# Patient Record
Sex: FEMALE | Race: WHITE | NOT HISPANIC OR LATINO | Employment: UNEMPLOYED | URBAN - METROPOLITAN AREA
[De-identification: names, ages, dates, MRNs, and addresses within clinical notes are randomized per-mention and may not be internally consistent; named-entity substitution may affect disease eponyms.]

---

## 2018-08-03 ENCOUNTER — TELEPHONE (OUTPATIENT)
Dept: TRANSPLANT | Facility: CLINIC | Age: 23
End: 2018-08-03

## 2018-08-03 NOTE — TELEPHONE ENCOUNTER
Christine Herrera called and stated that she is interested in becoming a living donor for her friend, Jackson Eaton.  Medical and social history obtained.  No contraindications noted.  All questions answered.  Education book emailed to patient. Instructed to contact me if she would like to be tested after reviewing the information. Patient agreed.

## 2018-08-10 ENCOUNTER — TELEPHONE (OUTPATIENT)
Dept: TRANSPLANT | Facility: CLINIC | Age: 23
End: 2018-08-10

## 2018-08-21 ENCOUNTER — TELEPHONE (OUTPATIENT)
Dept: TRANSPLANT | Facility: CLINIC | Age: 23
End: 2018-08-21

## 2018-08-21 DIAGNOSIS — Z00.5 TRANSPLANT DONOR EVALUATION: Primary | ICD-10-CM

## 2018-08-22 ENCOUNTER — LAB VISIT (OUTPATIENT)
Dept: LAB | Facility: HOSPITAL | Age: 23
End: 2018-08-22
Payer: MEDICARE

## 2018-08-22 DIAGNOSIS — Z00.5 TRANSPLANT DONOR EVALUATION: ICD-10-CM

## 2018-08-22 PROCEDURE — 86901 BLOOD TYPING SEROLOGIC RH(D): CPT | Mod: TXP

## 2018-08-22 PROCEDURE — 81373 HLA I TYPING 1 LOCUS LR: CPT | Mod: 91,PO,TXP

## 2018-08-22 PROCEDURE — 81376 HLA II TYPING 1 LOCUS LR: CPT | Mod: 91,PO,TXP

## 2018-08-22 PROCEDURE — 81373 HLA I TYPING 1 LOCUS LR: CPT | Mod: PO,TXP

## 2018-08-22 PROCEDURE — 36415 COLL VENOUS BLD VENIPUNCTURE: CPT | Mod: TXP

## 2018-08-22 PROCEDURE — 81376 HLA II TYPING 1 LOCUS LR: CPT | Mod: PO,TXP

## 2018-08-24 LAB
ABO GROUP BLD: NORMAL
RH BLD: NORMAL

## 2018-09-04 ENCOUNTER — TELEPHONE (OUTPATIENT)
Dept: TRANSPLANT | Facility: CLINIC | Age: 23
End: 2018-09-04

## 2018-09-04 NOTE — TELEPHONE ENCOUNTER
Patient notified that the results of the crossmatch with [employer] show that they are compatible. States she would like to proceed with the medical evaluation. Information to apply for travel assistance emailed to patient. Will hold on scheduling appointments until we know if she will qualify for travel assistance. All questions were answered and patient verbalized understanding.

## 2018-10-12 ENCOUNTER — TELEPHONE (OUTPATIENT)
Dept: TRANSPLANT | Facility: CLINIC | Age: 23
End: 2018-10-12

## 2018-10-12 DIAGNOSIS — Z00.5 TRANSPLANT DONOR EVALUATION: Primary | ICD-10-CM

## 2019-01-04 ENCOUNTER — TELEPHONE (OUTPATIENT)
Dept: TRANSPLANT | Facility: CLINIC | Age: 24
End: 2019-01-04

## 2019-01-04 NOTE — TELEPHONE ENCOUNTER
"SW received call back from [phone]. Pt reports the number listed in epic is no longer in service [phone]. Pt reports [phone] is the new contact number. SW verbalized understanding. SW proceeded with questions regarding NLDAC.     Pt reports minor children will reside with mother in [place] while pt is staying locally for appointments. SW inquired about proof of income.  Pt also reports she does not work but her  is employed as an  with the [employer]. Pt reports  is paid daily, by check. Therefore, she could not provide proof of income. SW reports proof of income is needed for application. Pt verbalized understanding.     SW also inquired about pt's need for service animals. Pt reports "I have emotional support animals. I was diagnosed with clinical depression, anxiety and bipolar depression when I was 14 years old". Pt reports no episodes but reports "this all started when my parents were going through a divorce and I spiraled out of control. He was in the  and we moved around a lot too". SW inquired if pt was followed by a mental health professional. Pt reports "no, I have done all my therapy. I have meds now and I have to keep my dogs with me". SW reports pt will need to be cleared by psychiatry. Pt verbalized understanding and agreement.     Pt reports appointments in the transplant clinic have already been scheduled for Jan 7,8 and 9. Pt reports ability to donate is dependent upon NLDAC approval.  SW verbalized understanding and reports application could take up to 10 days for approval.  Pt verbalized understanding. SW remains available. NLDAC will be completed by JAMAR Villalobos.   "

## 2019-01-04 NOTE — TELEPHONE ENCOUNTER
"SW attempted to contact pt with questions regarding NLDAC application. SW called to inquire about pt's employment status, income , need for service animal,care for children and caregiver. RISHABH was not able to speak with pt after several attempts. RISHABH was not able to leave a message. SW contacted pt's recipient to inquire about pt's additional contact numbers. Recipient reported number listed in epic and reports "she might not be answering cause she is at work". SW verbalized understanding. SW will attempt contact at another time and remains available.   "

## 2019-01-07 ENCOUNTER — TELEPHONE (OUTPATIENT)
Dept: TRANSPLANT | Facility: CLINIC | Age: 24
End: 2019-01-07

## 2019-01-07 NOTE — TELEPHONE ENCOUNTER
Message received from Blanca Dahl that patient would like to cancel testing for today and tomorrow. Called patient, phone not working. e-mail sent to patient notifying her that appointments have been cancelled. She can call to reschedule when ready.

## 2019-01-11 ENCOUNTER — TELEPHONE (OUTPATIENT)
Dept: TRANSPLANT | Facility: CLINIC | Age: 24
End: 2019-01-11

## 2019-01-11 NOTE — TELEPHONE ENCOUNTER
Patient calling to inquire about status of travel assistance. Informed that  was trying to reach her to obtain additional information. Phone number has changed, Epic updated.  Transferred to ADEOLA Pearson.     ----- Message from Lachelle Werner sent at 1/11/2019  3:09 PM CST -----  Contact: Patient  Needs Advice    Reason for call: Calling to speak with coordinator        Communication Preference: [phone]    Additional Information: N/A

## 2019-01-14 ENCOUNTER — TELEPHONE (OUTPATIENT)
Dept: TRANSPLANT | Facility: CLINIC | Age: 24
End: 2019-01-14

## 2019-01-14 NOTE — TELEPHONE ENCOUNTER
"SW spoke with pt to obtain additional information needed for the [facility] application.    Additionally, SW talked about pt traveling with her service animal and children. Pt reports that she has a American Pitbull Terrier as her service animal and reports that she has all of the documentation that goes along with the service animal. SW asked about pt's children. Pt reports having 2 minor children age 1 and 2. Pt reports that for the evaluation, she has someone who will be able to watch her children, but plans to bring them for the surgery. SW explained that it would be difficult for her caregiver/husbnad to take care of all of them post surgery. Pt then reports that "Danelle" whom pt later identified as the recipient's wife would care for the children. SW explained that Danelle could not because she would be caring for the recipient. Pt then reports that the recipient's adult children would assist with care. SW asked if they were aware and she reports yes. SW also informed pt that if pt is relying on [facility] funds for travel and lodging, they are likely to not cover travel for her children, but only for the accompanying person. Patient verbalized understanding and agreement.       Pt's caregiver plan will need to assessed for self and minor children at time of her initial assessment.  "

## 2019-01-17 ENCOUNTER — TELEPHONE (OUTPATIENT)
Dept: TRANSPLANT | Facility: CLINIC | Age: 24
End: 2019-01-17

## 2019-01-17 NOTE — TELEPHONE ENCOUNTER
SW attempted to return pt's call to discuss her NLDAC application. There was no option to leave a voicemail due to mailbox being full.     SW remains available to pt, pt's family, and transplant team at [phone].        ----- Message from Elsa Cloud sent at 1/17/2019  1:55 PM CST -----  Calleen, I think this is probably for you. I didn't call her.     ----- Message -----  From: Sherrill Nicolas  Sent: 1/17/2019  12:36 PM  To: [facility] Living Donor Coordinator    [facility] Call [facility]    Who Left Message for Patient: Elsa PRADO  Communication Preference: [phone]  Additional Information:

## 2019-01-24 ENCOUNTER — TELEPHONE (OUTPATIENT)
Dept: TRANSPLANT | Facility: CLINIC | Age: 24
End: 2019-01-24

## 2019-01-24 NOTE — TELEPHONE ENCOUNTER
SW attempted to speak with pt regarding proof of income in order to SW to complete NLDAC application. NLDAC reports ability to approve application if pt provides proper documentation. SW was able to leave a message. SW awaits update.

## 2019-01-24 NOTE — TELEPHONE ENCOUNTER
SW returned pt's call. SW informed pt that NLDA was in need of pt's bank statements to verify pt's income. Pt reports that she will email them as soon as possible. RISHABH remains available to pt, pt's family, and transplant team at [phone].      ----- Message from Lachelle Werner sent at 1/24/2019  4:03 PM CST -----  Contact: Patient  Patient Returning Call from [facility]    Who Left Message for Patient: Kassie    Communication Preference: [phone]     Additional Information: N/A

## 2019-03-13 ENCOUNTER — TELEPHONE (OUTPATIENT)
Dept: TRANSPLANT | Facility: CLINIC | Age: 24
End: 2019-03-13

## 2019-03-13 NOTE — TELEPHONE ENCOUNTER
Called twice, call answered then hung up both times.    ----- Message from Lachelle Werner sent at 3/13/2019  1:45 PM CDT -----  Contact: Patient  Needs Advice    Reason for call: Calling to get the phone number to the living donor assistance program        Communication Preference: [phone]     Additional Information: N/A

## 2019-04-03 ENCOUNTER — HOSPITAL ENCOUNTER (OUTPATIENT)
Dept: CARDIOLOGY | Facility: CLINIC | Age: 24
Discharge: HOME | End: 2019-04-03
Payer: MEDICARE

## 2019-04-03 ENCOUNTER — HOSPITAL ENCOUNTER (OUTPATIENT)
Dept: RADIOLOGY | Facility: HOSPITAL | Age: 24
Discharge: HOME | End: 2019-04-03
Attending: NURSE PRACTITIONER
Payer: MEDICARE

## 2019-04-03 DIAGNOSIS — Z00.5 TRANSPLANT DONOR EVALUATION: ICD-10-CM

## 2019-04-03 PROCEDURE — 93010 EKG 12-LEAD: ICD-10-PCS | Mod: S$PBB,TXP,, | Performed by: INTERNAL MEDICINE

## 2019-04-03 PROCEDURE — 78701 KIDNEY IMAGING WITH FLOW: CPT | Mod: TC,TXP

## 2019-04-03 PROCEDURE — 78701 KIDNEY IMAGING WITH FLOW: CPT | Mod: 26,TXP,, | Performed by: RADIOLOGY

## 2019-04-03 PROCEDURE — 71046 X-RAY EXAM CHEST 2 VIEWS: CPT | Mod: TC,FY,TXP

## 2019-04-03 PROCEDURE — 93010 ELECTROCARDIOGRAM REPORT: CPT | Mod: S$PBB,TXP,, | Performed by: INTERNAL MEDICINE

## 2019-04-03 PROCEDURE — 78701 NM KIDNEY IMAGING MORPH W VASCULAR: ICD-10-PCS | Mod: 26,TXP,, | Performed by: RADIOLOGY

## 2019-04-03 PROCEDURE — 93005 ELECTROCARDIOGRAM TRACING: CPT | Mod: PBBFAC,TXP | Performed by: INTERNAL MEDICINE

## 2019-04-03 PROCEDURE — A9567 TECHNETIUM TC-99M AEROSOL: HCPCS | Mod: TXP

## 2019-04-03 PROCEDURE — 71046 XR CHEST PA AND LATERAL: ICD-10-PCS | Mod: 26,TXP,, | Performed by: RADIOLOGY

## 2019-04-03 PROCEDURE — 71046 X-RAY EXAM CHEST 2 VIEWS: CPT | Mod: 26,TXP,, | Performed by: RADIOLOGY

## 2019-04-04 ENCOUNTER — OFFICE VISIT (OUTPATIENT)
Dept: TRANSPLANT | Facility: CLINIC | Age: 24
End: 2019-04-04
Payer: MEDICARE

## 2019-04-04 ENCOUNTER — OFFICE VISIT (OUTPATIENT)
Dept: PSYCHIATRY | Facility: CLINIC | Age: 24
End: 2019-04-04
Payer: MEDICARE

## 2019-04-04 ENCOUNTER — HOSPITAL ENCOUNTER (OUTPATIENT)
Dept: RADIOLOGY | Facility: HOSPITAL | Age: 24
Discharge: HOME | End: 2019-04-04
Attending: TRANSPLANT SURGERY
Payer: MEDICARE

## 2019-04-04 VITALS
WEIGHT: 177.25 LBS | HEIGHT: 65 IN | BODY MASS INDEX: 29.53 KG/M2 | DIASTOLIC BLOOD PRESSURE: 60 MMHG | SYSTOLIC BLOOD PRESSURE: 127 MMHG | HEART RATE: 79 BPM | OXYGEN SATURATION: 99 % | TEMPERATURE: 99 F | RESPIRATION RATE: 18 BRPM

## 2019-04-04 DIAGNOSIS — Z00.5 TRANSPLANT DONOR EVALUATION: ICD-10-CM

## 2019-04-04 DIAGNOSIS — Z01.818 PRE-OP EXAM: Primary | ICD-10-CM

## 2019-04-04 DIAGNOSIS — Z52.4 KIDNEY DONOR: ICD-10-CM

## 2019-04-04 DIAGNOSIS — Z00.5 TRANSPLANT DONOR EVALUATION: Primary | ICD-10-CM

## 2019-04-04 DIAGNOSIS — N96 HISTORY OF MULTIPLE MISCARRIAGES: ICD-10-CM

## 2019-04-04 PROCEDURE — 96131 PSYCL TST EVAL PHYS/QHP EA: CPT | Mod: PBBFAC,TXP | Performed by: PSYCHOLOGIST

## 2019-04-04 PROCEDURE — 96138 PSYCL/NRPSYC TECH 1ST: CPT | Mod: PBBFAC,TXP | Performed by: PSYCHOLOGIST

## 2019-04-04 PROCEDURE — 99999 PR PBB SHADOW E&M-EST. PATIENT-LVL V: ICD-10-PCS | Mod: PBBFAC,TXP,, | Performed by: INTERNAL MEDICINE

## 2019-04-04 PROCEDURE — 96130 PSYCL TST EVAL PHYS/QHP 1ST: CPT | Mod: PBBFAC,TXP | Performed by: PSYCHOLOGIST

## 2019-04-04 PROCEDURE — 90791 PSYCH DIAGNOSTIC EVALUATION: CPT | Mod: PBBFAC,TXP | Performed by: PSYCHOLOGIST

## 2019-04-04 PROCEDURE — 74174 CTA ABD&PLVS W/CONTRAST: CPT | Mod: TC,TXP

## 2019-04-04 PROCEDURE — 96139 PSYCL/NRPSYC TST TECH EA: CPT | Mod: TXP,,, | Performed by: PSYCHOLOGIST

## 2019-04-04 PROCEDURE — 96131 PR PSYCHOLOGIC TEST EVAL SVCS, EA ADDTL HR: ICD-10-PCS | Mod: S$PBB,TXP,, | Performed by: PSYCHOLOGIST

## 2019-04-04 PROCEDURE — 74174 CTA ABDOMEN AND PELVIS: ICD-10-PCS | Mod: 26,TXP,, | Performed by: RADIOLOGY

## 2019-04-04 PROCEDURE — 96138 PSYCL/NRPSYC TECH 1ST: CPT | Mod: S$PBB,TXP,, | Performed by: PSYCHOLOGIST

## 2019-04-04 PROCEDURE — 99204 OFFICE O/P NEW MOD 45 MIN: CPT | Mod: S$PBB,TXP,, | Performed by: TRANSPLANT SURGERY

## 2019-04-04 PROCEDURE — 96137 PSYCL/NRPSYC TST PHY/QHP EA: CPT | Mod: PBBFAC,TXP | Performed by: PSYCHOLOGIST

## 2019-04-04 PROCEDURE — 99999 PR PBB SHADOW E&M-EST. PATIENT-LVL V: CPT | Mod: PBBFAC,TXP,, | Performed by: INTERNAL MEDICINE

## 2019-04-04 PROCEDURE — 25500020 PHARM REV CODE 255: Mod: TXP | Performed by: TRANSPLANT SURGERY

## 2019-04-04 PROCEDURE — 96139 PR PSYCH/NEUROPSYCH TEST ADMIN/SCORING, BY TECH, 2+ TESTS, EA ADDTL 30 MIN: ICD-10-PCS | Mod: TXP,,, | Performed by: PSYCHOLOGIST

## 2019-04-04 PROCEDURE — 99205 PR OFFICE/OUTPT VISIT, NEW, LEVL V, 60-74 MIN: ICD-10-PCS | Mod: S$PBB,TXP,, | Performed by: INTERNAL MEDICINE

## 2019-04-04 PROCEDURE — 99205 OFFICE O/P NEW HI 60 MIN: CPT | Mod: S$PBB,TXP,, | Performed by: INTERNAL MEDICINE

## 2019-04-04 PROCEDURE — 96130 PR PSYCHOLOGIC TEST EVAL SVCS, 1ST HR: ICD-10-PCS | Mod: S$PBB,TXP,, | Performed by: PSYCHOLOGIST

## 2019-04-04 PROCEDURE — 96131 PSYCL TST EVAL PHYS/QHP EA: CPT | Mod: S$PBB,TXP,, | Performed by: PSYCHOLOGIST

## 2019-04-04 PROCEDURE — 96138 PR PSYCH/NEUROPSYCH TEST ADMIN/SCORING, BY TECH, 2+ TESTS, 1ST 30 MIN: ICD-10-PCS | Mod: S$PBB,TXP,, | Performed by: PSYCHOLOGIST

## 2019-04-04 PROCEDURE — 99215 OFFICE O/P EST HI 40 MIN: CPT | Mod: PBBFAC,25,TXP | Performed by: INTERNAL MEDICINE

## 2019-04-04 PROCEDURE — 96130 PSYCL TST EVAL PHYS/QHP 1ST: CPT | Mod: S$PBB,TXP,, | Performed by: PSYCHOLOGIST

## 2019-04-04 PROCEDURE — 90791 PR PSYCHIATRIC DIAGNOSTIC EVALUATION: ICD-10-PCS | Mod: S$PBB,TXP,, | Performed by: PSYCHOLOGIST

## 2019-04-04 PROCEDURE — 74174 CTA ABD&PLVS W/CONTRAST: CPT | Mod: 26,TXP,, | Performed by: RADIOLOGY

## 2019-04-04 PROCEDURE — 99204 PR OFFICE/OUTPT VISIT, NEW, LEVL IV, 45-59 MIN: ICD-10-PCS | Mod: S$PBB,TXP,, | Performed by: TRANSPLANT SURGERY

## 2019-04-04 PROCEDURE — 90791 PSYCH DIAGNOSTIC EVALUATION: CPT | Mod: S$PBB,TXP,, | Performed by: PSYCHOLOGIST

## 2019-04-04 RX ADMIN — IOHEXOL 125 ML: 350 INJECTION, SOLUTION INTRAVENOUS at 06:04

## 2019-04-04 NOTE — PATIENT INSTRUCTIONS
1. Avoid excess use of ibuprofen, naproxen, Motrin (NSAIDs), protein consumption, iodinated contrast dye  2. Stay active   3. Follow-up with your PCP  4. We will have you repeat the urine collection

## 2019-04-04 NOTE — PROGRESS NOTES
Transplant Surgery Kidney Donor Evaluation     Referring Physician:     Subjective:     Chief Complaint: Christine Herrera is a 24 y.o. year old female who presents today wishing to be evaluated as a potential living unrelated donor for  of friend on facebook.    History of Present Illness:  Christine reports being here without coercion, payment, guilt, or other alternative motives other than wanting to help someone with kidney disease.    Review of Systems   Constitutional: Negative for activity change and chills.   HENT: Negative for congestion and sore throat.    Eyes: Negative for discharge and redness.   Respiratory: Negative for cough and shortness of breath.    Cardiovascular: Negative for chest pain and palpitations.   Gastrointestinal: Negative for nausea and vomiting.   Endocrine: Negative for polydipsia and polyuria.   Genitourinary: Negative for dysuria and frequency.   Musculoskeletal: Negative for arthralgias and gait problem.   Skin: Negative for pallor and rash.   Neurological: Negative for dizziness and light-headedness.   Hematological: Negative for adenopathy. Does not bruise/bleed easily.   Psychiatric/Behavioral: Negative for agitation and suicidal ideas.       Objective:   Physical Exam   Constitutional: She is oriented to person, place, and time. She appears well-developed and well-nourished. No distress.   Neck: Normal range of motion. Neck supple. No JVD present.   Cardiovascular: Normal rate and intact distal pulses.   Pulmonary/Chest: Effort normal. No respiratory distress.   Abdominal: Soft. She exhibits no distension and no mass. There is no tenderness. There is no rebound and no guarding.   Musculoskeletal: She exhibits no edema.   Neurological: She is alert and oriented to person, place, and time.   Skin: Skin is warm and dry. She is not diaphoretic.   Psychiatric: She has a normal mood and affect.     ABO type: A POS    Diagnoses:  1. Transplant donor evaluation    2. History of multiple  miscarriages             Transplant Surgery - Candidacy   Assessment/Plan:     Donor Candidacy: Based on information available thus far, Christine is marginal candidate for living kidney donation. I have concerns with her level of insight into donation. Pt tearful in clinic when discussing risks of donation.    Josse Bean MD       Education: I discussed with the patient the requirements for donation including the compatibility of blood and tissue typing, healthy by physical examination and workup, as well as the desire to donate.  We discussed the risks related to surgery including the risks related to anesthesia, bleeding, infection, inability for surgery to be performed laparoscopically, risks of reoperation as well as the risks of death.  We discussed the length of hospitalization, return to work times, as well as follow-up post-donation.    I also discussed the slight possibility that due to problems with the recipient operation, the transplant might not be able to be completed after the organ was already removed. If such a situation should arise, the donor prefers that the organ be transplanted into a suitable third-party recipient.    I discussed the possibility that living donor sometimes encounter problems obtaining health insurance, or could have higher premium despite ongoing efforts of transplant professionals to educate insurance companies on this issue.    I discussed with Christine that donation is a voluntary activity, and reiterated it should be done willingly and for altruistic reasons only.  I reviewed that no payment should be made for donating.  I also discussed that coersion, guilt, pressure, or feelings of obligation are not appropriate reasons to donate.  The option to withdraw at any time was emphasized.  Christine was reminded that a medical opt out can be given to protect her confidentiality, and no member of the transplant team will discuss specifics of her health or medical/social history with anyone  else without permission.  The need for lifelong routine medical follow-up for optimal health, including routine health maintenance was reviewed.    Additionally, I discussed the need for our program to be able to contact living donors for UNOS reporting purposes for a minimum of 2 years.  Failure of our center to be able to provide such information could jeopardize our ability to continue to offer living donor transplants.  Christine voices understanding and agrees to this follow-up.    I discussed the UNOS requirement for centers to report donor status for a minimum of two years. Christine understands that failure to comply with requirement could have adverse consequences for our transplant program, and agrees to cooperate with all our required follow-up.    I reviewed with Christine available lab results and other diagnostics from the evaluation process.
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
## 2019-04-04 NOTE — PROGRESS NOTES
PHARM.D. PRE-TRANSPLANT DONOR NOTE:    This patient's medication therapy was evaluated as part of her pre-transplant evaluation.      The following general pharmacologic concerns were noted: patient is not currently taking any medications.  She states that she took sertraline in the past but has not taken it in the past few months.      No current outpatient medications on file.     No current facility-administered medications for this visit.          Currently Ms Herrera is responsible for preparing / administering this patient's medications on a daily basis.  I am available for consultation and can be contacted, as needed by the other members of the Kidney Transplant team.

## 2019-04-04 NOTE — LETTER
April 4, 2019                     [address] [facility]  [address]  [facility] [address]  Phone: [phone]   Patient: Christine Herrera   MR Number: [id number]   YOB: 1995   Date of Visit: 4/4/2019       Dear      Thank you for referring Christine Herrera to me for evaluation. Attached you will find relevant portions of my assessment and plan of care.    If you have questions, please do not hesitate to call me. I look forward to following Christine Herrera along with you.    Sincerely,    Kathryn Walter MD    Enclosure    If you would like to receive this communication electronically, please contact [email or web address] or [phone] to request You[employer] Link access.    YouApp[employer] Link is a tool which provides read-only access to select patient information with whom you have a relationship. Its easy to use and provides real time access to review your patients record including encounter summaries, notes, results, and demographic information.    If you feel you have received this communication in error or would no longer like to receive these types of communications, please e-mail [email or web address]

## 2019-04-04 NOTE — PROGRESS NOTES
"DONOR TEACHING NOTE    Met with Christine Herrera today in clinic to review living donor education.        Topics covered included:  · Kidney donation is done voluntarily and of the donor's free will.  Process can stop at any time.   · Better success rates than cadaveric donation, shorter waiting time for the recipient: less than the 3-5 year wait on the list, more time to prepare: tests/surgery can be planned  · Laboratory studies of blood and urine.  Health exams: records of GYN/pap/mammogram (females); evaluation by transplant team and a psychiatrist (if indicated); diagnostic Tests: CXR, EKG, TB skin test, 24-hour urine collection, renal scan, CT of abdomen to assess kidney anatomy, and stress test (if indicated)  · Team will review workup for approval.  Copy of the approved criteria for donation given to patient.  Surgeon will decide which kidney will be donated.   · Benefits of laparoscopic nephrectomy: less pain, shorter hospital stay, a shorter recovery period.  Risks discussed: bleeding, deep vein thrombosis, pulmonary embolus, the need for re-operation.  Your operation may be converted to an "open" procedure if the surgeon feels it is medically necessary.  · To recovery room, transfer to TSU, if space allows or semi-private room.  Ramirez catheter/IV, average hospital stay is 1 day.  At discharge the cost of any prescriptions is the donor's responsibility.  · Post-operative visit 4 weeks after surgery or prior to returning to work, unless a complication arises and you need to be seen sooner.  Off of work for about 3 to 6 weeks, no driving for at least the first 3 weeks.  General surgical complications: infection, allergic reaction, and anesthesia.   · Long life considerations of living with one kidney: risk of HTN and kidney failure   · Following up with PCP 6 months after donation then yearly thereafter to monitor kidney function.  This should include weight, labs, urinalysis, and a blood pressure check.  We are " required to report your progress to [facility] at 6 months, 1 year, and 2 years post-donation.     Reviewed test results from yesterday. Patient confirmed she was having her menstrual cycle when urine collected. Informed and understands that these will have to be repeated.   Also discussed results of nuclear medicine GFR. Explained that this test shows that kidney function is lower than expected for age and what is needed for donation. Will review with nephrologist to determine if we can proceed with testing or if additional testing is indicated.   Discussed the risk of preeclampsia after donation, especially given that she had preeclampsia with her second pregnancy.     Written educational material provided. Informed consent reviewed and signed. All questions were answered and patient verbalized understanding.     Patient is a marginal candidate for living kidney donation.
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
## 2019-04-08 ENCOUNTER — TELEPHONE (OUTPATIENT)
Dept: PSYCHIATRY | Facility: CLINIC | Age: 24
End: 2019-04-08
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
## 2019-04-23 LAB
ALBUMIN/CREAT UR: 4.1 MG/G CREAT (ref 0–30)
APPEARANCE UR: ABNORMAL
BACTERIA #/AREA URNS HPF: ABNORMAL /[HPF]
BACTERIA UR CULT: ABNORMAL
BACTERIA UR CULT: ABNORMAL
BILIRUB UR QL STRIP: NEGATIVE
COLOR UR: YELLOW
CREAT 24H UR-MCNC: 90.9 MG/DL
CREAT 24H UR-MRATE: 1136 MG/24 HR (ref 800–1800)
CREAT UR-MCNC: 101.2 MG/DL
CREATINE 24H UR-MRATE: 110 MG/24 HR (ref 0–80)
CREATINE UR-MCNC: 8.8 MG/DL
CREATINE/CREATININE [MASS RATIO] IN URINE: 0.1 RATIO (ref 0–0.1)
CREATININE [MASS/TIME] IN 12 HOUR URINE: 1265
EPI CELLS #/AREA URNS HPF: ABNORMAL /HPF (ref 0–10)
GLUCOSE UR QL: NEGATIVE
HGB UR QL STRIP: NEGATIVE
KETONES UR QL STRIP: NEGATIVE
LEUKOCYTE ESTERASE UR QL STRIP: ABNORMAL
MICRO URNS: ABNORMAL
MICROALBUMIN UR-MCNC: 4.1 UG/ML
MUCOUS THREADS URNS QL MICRO: PRESENT
NITRITE UR QL STRIP: POSITIVE
OTHER ANTIBIOTIC SUSC ISLT: ABNORMAL
PH UR STRIP: 6.5 [PH] (ref 5–7.5)
PROT 24H UR-MRATE: 259 MG/24 HR (ref 30–150)
PROT UR QL STRIP: NEGATIVE
PROT UR-MCNC: 20.7 MG/DL
RBC #/AREA URNS HPF: ABNORMAL /HPF (ref 0–2)
SP GR UR: 1.02 (ref 1–1.03)
UROBILINOGEN UR STRIP-MCNC: 0.2 MG/DL (ref 0.2–1)
WBC #/AREA URNS HPF: ABNORMAL /HPF (ref 0–5)

## 2019-04-24 ENCOUNTER — TELEPHONE (OUTPATIENT)
Dept: TRANSPLANT | Facility: CLINIC | Age: 24
End: 2019-04-24

## 2019-04-24 RX ORDER — NITROFURANTOIN (MACROCRYSTALS) 100 MG/1
100 CAPSULE ORAL EVERY 12 HOURS
Qty: 10 CAPSULE | Refills: 0 | Status: SHIPPED | OUTPATIENT
Start: 2019-04-24 | End: 2019-04-29

## 2019-04-24 NOTE — TELEPHONE ENCOUNTER
Results of urine testing reviewed with Dr. Weaver. Will treat positive urine culture with antibiotics and repeat 24 hour urine protein 2 weeks after treatment completed.   Patient notified of test results and plan. All questions were answered.

## 2019-05-17 LAB
HLA DRB4 1: NORMAL
HLA SSO DNA TYPING CLASS I & II INTERPRETATION: NORMAL
HLA-A 1 SERO. EQUIV: 31
HLA-A 1: NORMAL
HLA-A 2 SERO. EQUIV: 32
HLA-A 2: NORMAL
HLA-B 1 SERO. EQUIV: 7
HLA-B 1: NORMAL
HLA-B 2 SERO. EQUIV: 27
HLA-B 2: NORMAL
HLA-BW 1 SERO. EQUIV: 6
HLA-BW 2 SERO. EQUIV: 4
HLA-C 1: NORMAL
HLA-C 2: NORMAL
HLA-CW 1 SERO. EQUIV: 1
HLA-CW 2 SERO. EQUIV: 7
HLA-DQ 1 SERO. EQUIV: 5
HLA-DQ 2 SERO. EQUIV: 6
HLA-DQB1 1: NORMAL
HLA-DQB1 2: NORMAL
HLA-DRB1 1 SERO. EQUIV: 1
HLA-DRB1 1: NORMAL
HLA-DRB1 2 SERO. EQUIV: 13
HLA-DRB1 2: NORMAL
HLA-DRB3 1: NORMAL
HLA-DRB3 2: NORMAL
HLA-DRB345 1 SERO. EQUIV: 52
HLA-DRB345 2 SERO. EQUIV: NORMAL
HLA-DRB4 2: NORMAL
HLA-DRB5 1: NORMAL
HLA-DRB5 2: NORMAL
SSDQB TESTING DATE: NORMAL
SSDRB TESTING DATE: NORMAL
SSOA TESTING DATE: NORMAL
SSOB TESTING DATE: NORMAL
SSOC TESTING DATE: NORMAL
SSODR TESTING DATE: NORMAL
TYSSO TESTING DATE: NORMAL

## 2019-06-20 ENCOUNTER — TELEPHONE (OUTPATIENT)
Dept: TRANSPLANT | Facility: CLINIC | Age: 24
End: 2019-06-20

## 2019-06-25 LAB
APPEARANCE UR: ABNORMAL
BACTERIA #/AREA URNS HPF: NORMAL /[HPF]
BACTERIA UR CULT: ABNORMAL
BACTERIA UR CULT: ABNORMAL
BILIRUB UR QL STRIP: NEGATIVE
COLOR UR: ABNORMAL
CREAT UR-MCNC: 152.3 MG/DL
CREATINE 24H UR-MRATE: 79 MG/24 HR (ref 0–80)
CREATINE UR-MCNC: 8.3 MG/DL
EPI CELLS #/AREA URNS HPF: NORMAL /HPF (ref 0–10)
GLUCOSE UR QL: NEGATIVE
HGB UR QL STRIP: NEGATIVE
KETONES UR QL STRIP: NEGATIVE
LEUKOCYTE ESTERASE UR QL STRIP: NEGATIVE
MICRO URNS: ABNORMAL
MUCOUS THREADS URNS QL MICRO: PRESENT
NITRITE UR QL STRIP: POSITIVE
OTHER ANTIBIOTIC SUSC ISLT: ABNORMAL
PH UR STRIP: 6.5 [PH] (ref 5–7.5)
PROT 24H UR-MRATE: 174 MG/24 HR (ref 30–150)
PROT UR QL STRIP: NEGATIVE
PROT UR-MCNC: 18.3 MG/DL
RBC #/AREA URNS HPF: NORMAL /HPF (ref 0–2)
SP GR UR: 1.02 (ref 1–1.03)
UROBILINOGEN UR STRIP-MCNC: 1 MG/DL (ref 0.2–1)
WBC #/AREA URNS HPF: NORMAL /HPF (ref 0–5)

## 2019-07-01 ENCOUNTER — TELEPHONE (OUTPATIENT)
Dept: TRANSPLANT | Facility: CLINIC | Age: 24
End: 2019-07-01

## 2019-07-01 NOTE — TELEPHONE ENCOUNTER
Message left that results of repeat urine received and will be reviewed with the team. Will contact her with teams decision.

## 2019-07-05 ENCOUNTER — TELEPHONE (OUTPATIENT)
Dept: TRANSPLANT | Facility: CLINIC | Age: 24
End: 2019-07-05

## 2019-07-05 NOTE — PROGRESS NOTES
I see this was reviewed by Dr Olivier 3 weeks ago. What the CrCl?  Are we repeating the alb/cr ratio and pr/cr ratio. A bit high for a young donor. She had a UTI at that time.

## 2019-07-05 NOTE — TELEPHONE ENCOUNTER
Patient notified of lab results. States she is having no sign or symptoms of a UTI. Informed that we will review with the team next week. All questions were answered.

## 2019-07-12 ENCOUNTER — COMMITTEE REVIEW (OUTPATIENT)
Dept: TRANSPLANT | Facility: CLINIC | Age: 24
End: 2019-07-12

## 2019-07-12 NOTE — COMMITTEE REVIEW
Christine Herrera was presented at selection committee on 7/12/19 . Patient did not meet selection criteria for living kidney donation due to proteinuria, recent history of pre-eclampsia, GFR 69.4 and discrepancy with creatinine clearance (250).     Patient notified of committee decision. Encouraged follow up with local physician. All questions were answered and patient verbalized understanding. Asked that I notify the recipient of committee decision.     Note written by Elsa Cloud RN    ===============================================================  I was present at the meeting and attest to the decision of the committee.    James Ibrahim  07/16/2019

## 2019-07-23 NOTE — TELEPHONE ENCOUNTER
[facility] application given to social workers to submit. Patient notified that we can schedule the testing. Required testing discussed and information provided to schedule appointments. Will have results of last pap smear faxed to us. All questions were answered and patient verbalized understanding.   
EMS